# Patient Record
(demographics unavailable — no encounter records)

---

## 2024-11-09 NOTE — REVIEW OF SYSTEMS
[As Noted in HPI] : as noted in HPI [Constipation] : constipation [Bloating (gassiness)] : bloating [Negative] : Heme/Lymph

## 2024-11-09 NOTE — HISTORY OF PRESENT ILLNESS
[FreeTextEntry1] : 80yo female with altered BMS  She most often will have relatively normal BM in the AM After that, she has frequent small pieces She notes that Miralax gives her diarrhea

## 2024-11-09 NOTE — HISTORY OF PRESENT ILLNESS
[FreeTextEntry1] : 78yo female with altered BMS  She most often will have relatively normal BM in the AM After that, she has frequent small pieces She notes that Miralax gives her diarrhea

## 2024-11-09 NOTE — ASSESSMENT
[FreeTextEntry1] : 78yo female with altered bowel habits  will add MOM prn will check colonoscopy Risks and benefits of procedure(s) discussed with patient in detail, including but not limited to, perforation, bleeding, reaction to anesthesia, missed lesions.

## 2024-11-09 NOTE — ASSESSMENT
[FreeTextEntry1] : 80yo female with altered bowel habits  will add MOM prn will check colonoscopy Risks and benefits of procedure(s) discussed with patient in detail, including but not limited to, perforation, bleeding, reaction to anesthesia, missed lesions.

## 2024-11-09 NOTE — PHYSICAL EXAM

## 2024-12-02 NOTE — REVIEW OF SYSTEMS
[Seasonal Allergies] : seasonal allergies [Ear Itch] : ear itch [Patient Intake Form Reviewed] : Patient intake form was reviewed [Negative] : Ear [de-identified] : dryness

## 2024-12-02 NOTE — HISTORY OF PRESENT ILLNESS
[de-identified] : longstanding ear ringing bilateral\par  hx hearing loss\par  co itching intermittent

## 2025-01-07 NOTE — HISTORY OF PRESENT ILLNESS
[de-identified] : The patient is a 79 year  old R hand dominant female who presents today complaining of R Shoulder pain.   Date of Injury/Onset: 09/1/2024 Pain:    At Rest: 0/10  With Activity:  6-8/10  Mechanism of injury: gradual onset, no specific MOLLY This is NOT a Work Related Injury being treated under Worker's Compensation. This is NOT an athletic injury occurring associated with an interscholastic or organized sports team. Quality of symptoms: intermittent sharp pain, dull lateral arm pain Improves with: rest Worse with: OH motions, reaching behind the back Prior treatment: none Prior Imaging: none Out of work/sport: not currently working School/Sport/Position/Occupation: retired Additional Information: HX of L shoulder bursitis in 2015

## 2025-01-07 NOTE — IMAGING
[Right] : right shoulder [de-identified] : The patient is a well appearing 79 year  old female of their stated age. Neck is supple & nontender to palpation. Negative Spurling's test.   Effected Shoulder: RIGHT Inspection: Scapula Winging: Negative Deformity: None Erythema: None Ecchymosis: None Abrasions: None Effusion: None   Range of Motion: Active Forward Flexion: 130 degrees Active Abduction: 130 degrees Passive Forward Flexion: 130 degrees Passive Abduction: 130 degrees ER @ 90 degrees: 90 degrees IR @ 90 degrees: 10 degrees ER @ 0 degrees: 30 degrees   Motor Exam: Forward Flexion: 5 out of 5 Flexion Plane of Scapula: 5 out of 5 Abduction: 5 out of 5 Internal Rotation: 5 out of 5 External Rotation: 5 out of 5 Distal Motor Strength: 5 out of 5   Stability Testing: Anterior: 1+ Posterior: 1+ Sulcus N: 1+ Sulcus ER: 1+ Provocative Tests: Drop Arm: Negative Impingement: POSITIVE  Kay: Negative X-Arm Adduction: Negative Belly Press: Negative Bear Hug: Negative Lift Off: Negative Apprehension: Negative Relocation: Negative Posterior Load & Shift: Negative   Palpation: AC Joint: Nontender Clavicle: Nontender SC Joint: Nontender Bicipital Groove: Nontender Coracoid Process: Nontender Pectoralis Minor Tendon: Nontender Pectoralis Major Tendon: Nontender & palpably intact Latissimus Dorsi: Nontender Proximal Humerus: Nontender Scapula Body: Nontender Medial Scapula Boarder: Nontender Scapula Spine: Nontender   Neurologic Exam: Sensation to Light Touch: Axillary: Grossly intact Ulnar: Grossly intact Radial: Grossly intact Median: Grossly intact Other:  N/A Circulatory/Pulses: Ulnar: 2+ Radial: 2+ Other Pertinent Findings: None     Contralateral Shoulder: Range of Motion: Active Forward Flexion: 180 degrees Active Abduction: 180 degrees Passive Forward Flexion: 180 degrees Passive Abduction: 180 degrees ER @ 90 degrees: 90 degrees IR @ 90 degrees: 45 degrees ER @ 0 degrees: 50 degrees Motor Exam: Forward Flexion: 5 out of 5 Flexion Plane of Scapula: 5 out of 5 Abduction: 5 out of 5 Internal Rotation: 5 out of 5 External Rotation: 5 out of 5 Distal Motor Strength: 5 out of 5 Stability Testing: Anterior: 1+ Posterior: 1+ Sulcus N: 1+ Sulcus ER: 1+ Other Pertinent Findings: None   Assessment: The patient is a 79 year old female with right shoulder pain and radiographic and physical exam findings consistent with glenohumeral OA. The patient's condition is acute. Documents/Results Reviewed Today: X-Ray right shoulder/scapula Tests/Studies Independently Interpreted Today: X-Ray right shoulder/scapula reveals evidence of advanced glenohumeral OA with spurring Pertinent findings include: 130/130/90/10/30, 5/5 throughout, +impingement  Confounding medical conditions/concerns: None   Plan: Discussed treatment options for the patient's glenohumeral OA. Patient will start physical therapy, HEP, and stretching. Prescribed patient Naproxen 500mg BID x 2 weeks, then PRN for pain management and inflammation - use as directed and take with food. Discussed treatment options in the form of injections to aid in pain, inflammation, and discomfort. Patient elected to receive Right shoulder 9/1 CSI. Advised patient to rest and ice the area as tolerated. Modify activity as discussed. Tests Ordered: None  Prescription Medications Ordered: Naproxen 500 mg Braces/DME Ordered: None Activity/Work/Sports Status: as tolerated Additional Instructions: None Follow-Up: 4 weeks   Procedure Note: Musculoskeletal Injection Diagnosis: Right shoulder glenohumeral OA Procedure: Right shoulder, subacromial, 9/1 CSI   Indication:  The patient has had persistent pain despite conservative treatment.  Risks, benefits and alternatives to procedure were discussed; all questions were answered to the patient's apparent satisfaction and informed consent obtained.  The patient denied prior problems with local anesthetics, injectable cortisones, chicken allergy, coagulopathy and no relevant drug or preservative allergies or sensitivities.   The area of injection was prepared in a sterile fashion.  Prior to injection a 'Time Out' was conducted in accordance with [facility] policy and the site and nature of procedure verified with the patient.   Procedure: The procedure was carried out utilizing sterile technique from a superolateral arthroscopic portal position. [The needle was placed under ultrasound guidance to improve accuracy and minimize risk to the patient and diagnostic ultrasound in the long and short axis revealed [pathology associated with the above diagnosis]] CSI DOES NOT USE US**  0cc of clear synovial fluid was aspirated. The specimen: (X) appeared benign and was discarded ( ) was sent for Culture / Cell Count / Crystal analysis / [_].]    Injection into the target area with care taken to aspirate frequently to minimize the risk of intravascular injection was performed with: ( ) 1cc of Depomedrol (80mg/ml) (X) 1cc of Dexamethasone (10mg/ml) ( ) 1cc of Toradol (30mg/ml) (X) 9cc of 0.5% Bupivacaine ( ) 1cc of 1% Lidocaine ( ) 5cc of 32mg Zilretta, prepared and diluted per  instructions ( ) 2 cc of Hylan G-F 20 (Synvisc) 16mg/2ml ( ) 6 cc of Hylan G-F 20 (SynvisoOne) 16mg/2ml ( ) 2cc of Euflexxa ( ) 2cc of Orthovisc ( ) 2cc of GelOne ( ) 3cc of Durolane (20mg/ml)   Patient tolerated the procedure well and direct pressure was applied for hemostasis. The patient was reminded of potential post-injection risks including, but not limited to, delayed hypersensitivity reactions and/or infection.  The patient verified that they had the office and the Emergency Room's contact information if any problems should arise.  After several minutes, the patient informed me that they felt fine and was released from the office.  The patient's current medication management of their orthopedic diagnosis was reviewed today: The patient was prescribed Naprosyn 500mg BID for two weeks and then as needed. (1) We discussed a comprehensive treatment plan that included possible pharmaceutical management involving the use of prescription strength medications versus over the counter oral medications and topical prescription vs over the counter medications.  Based on our extensive discussion, the patient was prescribed Naprosyn 500mg BID for two weeks.  It will then be used PRN for pain, inflammation and discomfort. (2) There is a moderate risk of morbidity with further treatment, especially from use of prescription strength medications and possible side effects of these medications which include upset stomach with oral medications, skin reactions to topical medications and cardiac/renal issues with long term use. (3) I recommended that the patient follow-up with their medical physician to discuss any significant specific potential issues with long term medication use such as interactions with current medications or with exacerbation of underlying medical comorbidities. (4) The benefits and risks associated with use of injectable, oral or topical, prescription and over the counter anti-inflammatory medications were discussed with the patient. The patient voiced understanding of the risks including but not limited to bleeding, stroke, kidney dysfunction, heart disease, and were referred to the black box warning label for further information.  IMindi attest that this documentation has been prepared under the direction and in the presence of Provider Dr. Elvis Reyes.  The documentation recorded by the scribe accurately reflects the services IDr. Elvis, personally performed and the decisions made by me. [FreeTextEntry1] : X-Ray right shoulder/scapula reveals evidence of advanced glenohumeral OA with spurring

## 2025-01-07 NOTE — IMAGING
[Right] : right shoulder [de-identified] : The patient is a well appearing 79 year  old female of their stated age. Neck is supple & nontender to palpation. Negative Spurling's test.   Effected Shoulder: RIGHT Inspection: Scapula Winging: Negative Deformity: None Erythema: None Ecchymosis: None Abrasions: None Effusion: None   Range of Motion: Active Forward Flexion: 130 degrees Active Abduction: 130 degrees Passive Forward Flexion: 130 degrees Passive Abduction: 130 degrees ER @ 90 degrees: 90 degrees IR @ 90 degrees: 10 degrees ER @ 0 degrees: 30 degrees   Motor Exam: Forward Flexion: 5 out of 5 Flexion Plane of Scapula: 5 out of 5 Abduction: 5 out of 5 Internal Rotation: 5 out of 5 External Rotation: 5 out of 5 Distal Motor Strength: 5 out of 5   Stability Testing: Anterior: 1+ Posterior: 1+ Sulcus N: 1+ Sulcus ER: 1+ Provocative Tests: Drop Arm: Negative Impingement: POSITIVE  [place]: Negative X-Arm Adduction: Negative Belly Press: Negative Bear Hug: Negative Lift Off: Negative Apprehension: Negative Relocation: Negative Posterior Load & Shift: Negative   Palpation: AC Joint: Nontender Clavicle: Nontender SC Joint: Nontender Bicipital Groove: Nontender Coracoid Process: Nontender Pectoralis Minor Tendon: Nontender Pectoralis Major Tendon: Nontender & palpably intact Latissimus Dorsi: Nontender Proximal Humerus: Nontender Scapula Body: Nontender Medial Scapula Boarder: Nontender Scapula Spine: Nontender   Neurologic Exam: Sensation to Light Touch: Axillary: Grossly intact Ulnar: Grossly intact Radial: Grossly intact Median: Grossly intact Other:  N/A Circulatory/Pulses: Ulnar: 2+ Radial: 2+ Other Pertinent Findings: None     Contralateral Shoulder: Range of Motion: Active Forward Flexion: 180 degrees Active Abduction: 180 degrees Passive Forward Flexion: 180 degrees Passive Abduction: 180 degrees ER @ 90 degrees: 90 degrees IR @ 90 degrees: 45 degrees ER @ 0 degrees: 50 degrees Motor Exam: Forward Flexion: 5 out of 5 Flexion Plane of Scapula: 5 out of 5 Abduction: 5 out of 5 Internal Rotation: 5 out of 5 External Rotation: 5 out of 5 Distal Motor Strength: 5 out of 5 Stability Testing: Anterior: 1+ Posterior: 1+ Sulcus N: 1+ Sulcus ER: 1+ Other Pertinent Findings: None   Assessment: The patient is a 79 year old female with right shoulder pain and radiographic and physical exam findings consistent with glenohumeral OA. The patient's condition is acute. Documents/Results Reviewed Today: X-Ray right shoulder/scapula Tests/Studies Independently Interpreted Today: X-Ray right shoulder/scapula reveals evidence of advanced glenohumeral OA with spurring Pertinent findings include: 130/130/90/10/30, 5/5 throughout, +impingement  Confounding medical conditions/concerns: None   Plan: Discussed treatment options for the patient's glenohumeral OA. Patient will start physical therapy, HEP, and stretching. Prescribed patient Naproxen 500mg BID x 2 weeks, then PRN for pain management and inflammation - use as directed and take with food. Discussed treatment options in the form of injections to aid in pain, inflammation, and discomfort. Patient elected to receive Right shoulder 9/1 CSI. Advised patient to rest and ice the area as tolerated. Modify activity as discussed. Tests Ordered: None  Prescription Medications Ordered: Naproxen 500 mg Braces/DME Ordered: None Activity/Work/Sports Status: as tolerated Additional Instructions: None Follow-Up: 4 weeks   Procedure Note: Musculoskeletal Injection Diagnosis: Right shoulder glenohumeral OA Procedure: Right shoulder, subacromial, 9/1 CSI   Indication:  The patient has had persistent pain despite conservative treatment.  Risks, benefits and alternatives to procedure were discussed; all questions were answered to the patient's apparent satisfaction and informed consent obtained.  The patient denied prior problems with local anesthetics, injectable cortisones, chicken allergy, coagulopathy and no relevant drug or preservative allergies or sensitivities.   The area of injection was prepared in a sterile fashion.  Prior to injection a 'Time Out' was conducted in accordance with [facility] [employer] [facility] policy and the site and nature of procedure verified with the patient.   Procedure: The procedure was carried out utilizing sterile technique from a superolateral arthroscopic portal position. [The needle was placed under ultrasound guidance to improve accuracy and minimize risk to the patient and diagnostic ultrasound in the long and short axis revealed [pathology associated with the above diagnosis]] CSI DOES NOT USE US**  0cc of clear synovial fluid was aspirated. The specimen: (X) appeared benign and was discarded ( ) was sent for Culture / Cell Count / Crystal analysis / [_].]    Injection into the target area with care taken to aspirate frequently to minimize the risk of intravascular injection was performed with: ( ) 1cc of Depomedrol (80mg/ml) (X) 1cc of Dexamethasone (10mg/ml) ( ) 1cc of Toradol (30mg/ml) (X) 9cc of 0.5% Bupivacaine ( ) 1cc of 1% Lidocaine ( ) 5cc of 32mg Zilretta, prepared and diluted per  instructions ( ) 2 cc of Hylan G-F 20 (Synvisc) 16mg/2ml ( ) 6 cc of Hylan G-F 20 (SynvisoOne) 16mg/2ml ( ) 2cc of Euflexxa ( ) 2cc of Orthovisc ( ) 2cc of GelOne ( ) 3cc of Durolane (20mg/ml)   Patient tolerated the procedure well and direct pressure was applied for hemostasis. The patient was reminded of potential post-injection risks including, but not limited to, delayed hypersensitivity reactions and/or infection.  The patient verified that they had the office and the Emergency Room's contact information if any problems should arise.  After several minutes, the patient informed me that they felt fine and was released from the office.  The patient's current medication management of their orthopedic diagnosis was reviewed today: The patient was prescribed Naprosyn 500mg BID for two weeks and then as needed. (1) We discussed a comprehensive treatment plan that included possible pharmaceutical management involving the use of prescription strength medications versus over the counter oral medications and topical prescription vs over the counter medications.  Based on our extensive discussion, the patient was prescribed Naprosyn 500mg BID for two weeks.  It will then be used PRN for pain, inflammation and discomfort. (2) There is a moderate risk of morbidity with further treatment, especially from use of prescription strength medications and possible side effects of these medications which include upset stomach with oral medications, skin reactions to topical medications and cardiac/renal issues with long term use. (3) I recommended that the patient follow-up with their medical physician to discuss any significant specific potential issues with long term medication use such as interactions with current medications or with exacerbation of underlying medical comorbidities. (4) The benefits and risks associated with use of injectable, oral or topical, prescription and over the counter anti-inflammatory medications were discussed with the patient. The patient voiced understanding of the risks including but not limited to bleeding, stroke, kidney dysfunction, heart disease, and were referred to the black box warning label for further information.  IMindi attest that this documentation has been prepared under the direction and in the presence of Provider Dr. Elvis Reyes.  The documentation recorded by the scribe accurately reflects the services IDr. Elvis, personally performed and the decisions made by me. [FreeTextEntry1] : X-Ray right shoulder/scapula reveals evidence of advanced glenohumeral OA with spurring

## 2025-01-07 NOTE — HISTORY OF PRESENT ILLNESS
[de-identified] : The patient is a 79 year  old R hand dominant female who presents today complaining of R Shoulder pain.   Date of Injury/Onset: 09/1/2024 Pain:    At Rest: 0/10  With Activity:  6-8/10  Mechanism of injury: gradual onset, no specific MOLLY This is NOT a Work Related Injury being treated under Worker's Compensation. This is NOT an athletic injury occurring associated with an interscholastic or organized sports team. Quality of symptoms: intermittent sharp pain, dull lateral arm pain Improves with: rest Worse with: OH motions, reaching behind the back Prior treatment: none Prior Imaging: none Out of work/sport: not currently working School/Sport/Position/Occupation: retired Additional Information: HX of L shoulder bursitis in 2015

## 2025-02-06 NOTE — HISTORY OF PRESENT ILLNESS
[FreeTextEntry1] : NAREN is a 79 year female who presents for f/u on UTIs, mild cystocele, elevated PVRs. Last seen in office on 08/12/2024, postvoid residuals have been under 200 for the most part and that she has not demonstrated any hydronephrosis on renal ultrasound - discussed options but opted to hold off on treatment. Going to [facility] PT with improvement in urgency/frequency. She had UTI sx on 01/13/2025, urine cx was negative.   US Renal 08/29/2024 - B/l renal cysts again identified not significantly changes in size. No hydronephrosis. Large PVR of 200cc.   UA w/ micro 01/13/2025 - Small leukocyte esterase Negative Urine Cx 01/13/2025

## 2025-02-06 NOTE — ADDENDUM
[FreeTextEntry1] : This note was written by Eva Allen, acting as the  for Dr. Hawthorne. This note accurately reflects the work and decisions made by Dr. Hawthorne.

## 2025-02-06 NOTE — DISCUSSION/SUMMARY
[FreeTextEntry1] : NAREN is a 79 year female who presents for f/u on UTIs, mild cystocele, elevated PVRs.   [] []   f/u All questions answered.

## 2025-02-06 NOTE — ADDENDUM
[FreeTextEntry1] : This note was written by Eva Allen, acting as the  for Dr. Hawthorne. This note accurately reflects the work and decisions made by Dr. Hawthorne. PAST SURGICAL HISTORY:  No significant past surgical history

## 2025-02-24 NOTE — IMAGING
[de-identified] : The patient is a well appearing 79 year  old female of their stated age. Neck is supple & nontender to palpation. Negative Spurling's test.   Effected Shoulder: RIGHT Inspection: Scapula Winging: Negative Deformity: None Erythema: None Ecchymosis: None Abrasions: None Effusion: None   Range of Motion: Active Forward Flexion: 140 degrees Active Abduction: 140 degrees Passive Forward Flexion: 140 degrees Passive Abduction: 140 degrees ER @ 90 degrees: 90 degrees IR @ 90 degrees: 5 degrees ER @ 0 degrees: 30 degrees   Motor Exam: Forward Flexion: 5 out of 5 Flexion Plane of Scapula: 5 out of 5 Abduction: 5 out of 5 Internal Rotation: 5 out of 5 External Rotation: 5- out of 5 Distal Motor Strength: 5 out of 5   Stability Testing: Anterior: 1+ Posterior: 1+ Sulcus N: 1+ Sulcus ER: 1+ Provocative Tests: Drop Arm: Negative Impingement: POSITIVE  Bleckley: Negative X-Arm Adduction: Negative Belly Press: Negative Bear Hug: Negative Lift Off: Negative Apprehension: Negative Relocation: Negative Posterior Load & Shift: Negative   Palpation: AC Joint: Nontender Clavicle: Nontender SC Joint: Nontender Bicipital Groove: Nontender Coracoid Process: Nontender Pectoralis Minor Tendon: Nontender Pectoralis Major Tendon: Nontender & palpably intact Latissimus Dorsi: Nontender Proximal Humerus: Nontender Scapula Body: Nontender Medial Scapula Boarder: Nontender Scapula Spine: Nontender   Neurologic Exam: Sensation to Light Touch: Axillary: Grossly intact Ulnar: Grossly intact Radial: Grossly intact Median: Grossly intact Other:  N/A Circulatory/Pulses: Ulnar: 2+ Radial: 2+ Other Pertinent Findings: None     Contralateral Shoulder: Range of Motion: Active Forward Flexion: 180 degrees Active Abduction: 180 degrees Passive Forward Flexion: 180 degrees Passive Abduction: 180 degrees ER @ 90 degrees: 90 degrees IR @ 90 degrees: 45 degrees ER @ 0 degrees: 50 degrees Motor Exam: Forward Flexion: 5 out of 5 Flexion Plane of Scapula: 5 out of 5 Abduction: 5 out of 5 Internal Rotation: 5 out of 5 External Rotation: 5 out of 5 Distal Motor Strength: 5 out of 5 Stability Testing: Anterior: 1+ Posterior: 1+ Sulcus N: 1+ Sulcus ER: 1+ Other Pertinent Findings: None   Assessment: The patient is a 79 year old female with right shoulder pain and radiographic and physical exam findings consistent with glenohumeral OA. The patient's condition is acute. Documents/Results Reviewed Today: None  Tests/Studies Independently Interpreted Today: None  Pertinent findings include: 140/140/90/5/30, 5/5 throughout except ER 5-/5, +impingement  Confounding medical conditions/concerns: None   Plan: The patient reports mild relief from recent right shoulder CSI on 1/6/25 however, condition has been aggravated since an increase in physical therapy. Discussed treatment options for the patient's glenohumeral arthritis, both operative vs non-operative. Patient will get back into physical therapy and HEP to focus on strengthening and ROM within a comfortable range to her. She may call for one more physical therapy Rx if desired. Continue use of previously prescribed Naproxen 500mg for pain management and inflammation - use as directed and take with food. The patient is aware and understands that if she fails all conservative treatment modalities, she could consider a total shoulder arthroplasty. Modify activity as discussed.  Tests Ordered: None  Prescription Medications Ordered: Continue Naproxen 500mg Braces/DME Ordered: None Activity/Work/Sports Status: as tolerated Additional Instructions: None Follow-Up: PRN   The patient's current medication management of their orthopedic diagnosis was reviewed today: (1) The patient will continue with the PRN use of their prescribed anti-inflammatory. (2) There is a moderate risk of morbidity with further treatment, especially from use of prescription strength medications and possible side effects of these medications which include upset stomach with oral medications, skin reactions to topical medications and cardiac/renal issues with long term use. (3) I recommended that the patient follow-up with their medical physician to discuss any significant specific potential issues with long term medication use such as interactions with current medications or with exacerbation of underlying medical comorbidities. (4) The benefits and risks associated with use of injectable, oral or topical, prescription and over the counter anti-inflammatory medications were discussed with the patient. The patient voiced understanding of the risks including but not limited to bleeding, stroke, kidney dysfunction, heart disease, and were referred to the black box warning label for further information.  IJoy attest that this documentation has been prepared under the direction and in the presence of Provider Dr. Elvis Reyes.   The documentation recorded by the scribe accurately reflects the service GEORGETTE Vargas PA-C personally performed and the decisions made by me. The patient was seen by me under the direct supervision of Dr. Elvis Reyes

## 2025-02-24 NOTE — HISTORY OF PRESENT ILLNESS
[de-identified] : The patient is a 79 year  old R hand dominant female who presents today complaining of R Shoulder pain.   Date of Injury/Onset: 09/1/2024 Pain:    At Rest: 0/10  With Activity:  6-8/10  Mechanism of injury: gradual onset, no specific MOLLY This is NOT a Work Related Injury being treated under Worker's Compensation. This is NOT an athletic injury occurring associated with an interscholastic or organized sports team. Quality of symptoms: intermittent sharp pain, dull lateral arm pain Improves with: rest Worse with: OH motions, reaching behind the back Treatment/Imaging/Studies Since Last Visit: CSI 1/6/25, 2x/week Professional PT Giorgio  	Reports Available For Review Today: none Out of work/sport: not currently working School/Sport/Position/Occupation: retired Changes since last visit: CSI last visit 1/6/25 with relief. Was feeling better with PT but then she left for a trip to [place] and was sick so she missed three weeks of PT. When she returned to PT they suggested she did two sessions back to back which increased her pain and she believes she is back at square one.  Additional Information: HX of L shoulder bursitis in 2015

## 2025-05-04 NOTE — HISTORY OF PRESENT ILLNESS
[FreeTextEntry1] : 80yo female with altered bowel habits  She has issues with constipation She has significant diverticulosis with spasm, likely contributing to her bowel issues MOM can help at times

## 2025-05-04 NOTE — ASSESSMENT
[FreeTextEntry1] : 80yo female with altered bowel habits  we discussed prune juice, MOM fiber to consider linzess pending above

## 2025-05-04 NOTE — PHYSICAL EXAM
[Alert] : alert [Normal Voice/Communication] : normal voice/communication [Healthy Appearing] : healthy appearing [No Acute Distress] : no acute distress [Sclera] : the sclera and conjunctiva were normal [Hearing Threshold Finger Rub Not [place]] : hearing was normal [Normal Lips/Gums] : the lips and gums were normal [Oropharynx] : the oropharynx was normal [Normal Appearance] : the appearance of the neck was normal [No Neck Mass] : no neck mass was observed [No Respiratory Distress] : no respiratory distress [No Acc Muscle Use] : no accessory muscle use [Respiration, Rhythm And Depth] : normal respiratory rhythm and effort [Auscultation Breath Sounds / Voice Sounds] : lungs were clear to auscultation bilaterally [Heart Rate And Rhythm] : heart rate was normal and rhythm regular [Normal S1, S2] : normal S1 and S2 [Murmurs] : no murmurs [Bowel Sounds] : normal bowel sounds [Abdomen Tenderness] : non-tender [No Masses] : no abdominal mass palpated [Abdomen Soft] : soft [] : no hepatosplenomegaly [Oriented To Time, Place, And Person] : oriented to person, place, and time

## 2025-05-16 NOTE — REVIEW OF SYSTEMS
[Palpitations] : palpitations [Negative] : Heme/Lymph [SOB] : no shortness of breath [Dyspnea on exertion] : not dyspnea during exertion [Chest Discomfort] : no chest discomfort [Lower Ext Edema] : no extremity edema [Orthopnea] : no orthopnea [PND] : no PND [Syncope] : no syncope [FreeTextEntry5] : brief rare palpitations

## 2025-05-16 NOTE — PHYSICAL EXAM
[Well Developed] : well developed [Well Nourished] : well nourished [No Acute Distress] : no acute distress [Normal S1, S2] : normal S1, S2 [No Murmur] : no murmur [No Rub] : no rub [No Gallop] : no gallop [Clear Lung Fields] : clear lung fields [Good Air Entry] : good air entry [No Respiratory Distress] : no respiratory distress  [Soft] : abdomen soft [Non Tender] : non-tender [No Masses/organomegaly] : no masses/organomegaly [Normal Bowel Sounds] : normal bowel sounds [Normal Gait] : normal gait [No Edema] : no edema [No Cyanosis] : no cyanosis [No Clubbing] : no clubbing [No Varicosities] : no varicosities [Moves all extremities] : moves all extremities [No Focal Deficits] : no focal deficits [Normal Speech] : normal speech [Alert and Oriented] : alert and oriented [Normal memory] : normal memory

## 2025-05-16 NOTE — CARDIOLOGY SUMMARY
[de-identified] : 5/17/24 : Sinus rhythm 63 bpm normal trace.  12/3/2021 sinus normal.  [de-identified] : [place] 11/7/24-11/14/2024: NSR, 3 episodes of AT longest 13 beats, avg 110bpm up to 131bpm fastest 3 beats avg 134bpm up to 171bpm. PAC <0.1%, PVC 0.5% [de-identified] : 11.22.2024: stress echo- achieved 10 mets, no ischemic ECG changes with stress. LVEF 55% and stress 65%, no stress induced wall motion abnormalities. Mild MR, Mild PI, trace TR 9/24/2020, normal LV function, no pulmonary hypertension, normal LA size, no mitral regurgitation LVEF 55-60%.  [de-identified] : 9/24/2020, normal LV function, no pulmonary hypertension, normal LA size, no mitral regurgitation LVEF 55-60%.  [de-identified] : 10/27/21 CT coronary calcium score 154. Moderate plaque burden. Moderate nonobstructive CAD higlylikely.  Calculated percentile 66%, [de-identified] : 11/18/21 : Carotid ultrasound. No significant plaque is sen within the carotid arteries bilaterally.

## 2025-05-16 NOTE — ASSESSMENT
[FreeTextEntry1] : This is a 79-year-old woman with palpitations. She is having less symptoms after increased dose of metoprolol and starting Mg Glycinate at HS.  Zio demonstrates PACs.     Palpitations;  - Would continue beta blocker. Metoprolol 37.5 mg daily - continue Mg Glycinate at HS - reviewed monitor from Dr. Nicole's office from November, rare PACs <0.1%, 3 brief AT episodes  Notes from Dr. Nicole, stress echo, labs reviewed F/u in office in 1 yr

## 2025-05-16 NOTE — HISTORY OF PRESENT ILLNESS
[FreeTextEntry1] : This is a 79-year-old woman with hypothyroidism, hyperlipidemia, hypertension who presents for follow up palpitations.  Past Zio patch reveals single PACs and many triggers were for sinus rhythm.  She reports very infrequent palpitations, tolerating Metoprolol succ 37.5mg and Magnesium Glycinate at HS. NAREN JONO denies chest pain, chest pressure, shortness of breath, lightheadedness, dizziness, palpitations, syncope, presyncope, orthopnea, PND, or edema.

## 2025-07-24 NOTE — ASSESSMENT
[FreeTextEntry1] : Plan: 78 yo with chronic constipation and bright red rectal bleeding. Likely hemorrhoidal secondary to constipation given sef limiting nature. Pt had colonoscopy this year, beside hemorrhoids otherwise normal. Discussed increase prune juice and fiber supplementation as necessary to manage constipation. For bleeding, recommend OTC creams if worsens in severity or frequency instructed to call the office. All questions answered.

## 2025-07-24 NOTE — PHYSICAL EXAM
[Alert] : alert [Healthy Appearing] : healthy appearing [Sclera] : the sclera and conjunctiva were normal [Hearing Threshold Finger Rub Not [place]] : hearing was normal [Normal Appearance] : the appearance of the neck was normal [No Respiratory Distress] : no respiratory distress [Auscultation Breath Sounds / Voice Sounds] : lungs were clear to auscultation bilaterally [Heart Rate And Rhythm] : heart rate was normal and rhythm regular [Bowel Sounds] : normal bowel sounds [Abdomen Tenderness] : non-tender [Abdomen Soft] : soft [Abnormal Walk] : normal gait [Normal Color / Pigmentation] : normal skin color and pigmentation [Oriented To Time, Place, And Person] : oriented to person, place, and time

## 2025-07-24 NOTE — HISTORY OF PRESENT ILLNESS
[FreeTextEntry1] : Stefanie Tipton is a 78 yo F presenting today for follow up for constipation as well as bright red rectal bleeding. Pt has had chronic constipation for years, currently managing with prune juice and fiber supplementation. Intermittently has 2-3 days of bright red rectal bleeding on toilet paper when wiping. Had colonoscopy earlier this year with hemorrhoids noted. Denies any unintentional weight loss, change in BMs.